# Patient Record
(demographics unavailable — no encounter records)

---

## 2024-12-30 NOTE — HISTORY OF PRESENT ILLNESS
[Right] : right hand dominant [FreeTextEntry1] : Presenting with about a 2-1/2-year course of bilateral shoulder pain.  He reports that about 7 years ago he had a left shoulder injury after lifting something heavy.  He never had it formally evaluated but did go to physical therapy and the pain got better with PT.  He does report that 2-1/2 years ago while lifting something heavy he had a rupture of his proximal biceps tendon.  He never sought treatment for this but does report having chronic shoulder pain starting around the time of the injury.  Over the last year and a half he has been going to therapy consistently for both his shoulders.  His therapy has focused on rotator cuff strengthening and range of motion.  He does report a steroid injection in the right shoulder by another physician performed about 6 months ago.  He reports no sustained improvement with physical therapy or the steroid injection.  He has taken Tylenol Motrin which does not control the pain.  He feels the pain significantly disrupts his daily life as he is always in pain and always has difficulty lifting heavy objects due to the pain.  He cannot sleep on either shoulder at night due to the pain.  He had an MRI performed at [facility] and had seen a shoulder surgeon there who was recommending a reverse total shoulder replacement.  He presents here today as a second opinion and because his insurance referred him here for evaluation for potential reverse total shoulder replacement.  Patient reports he has extensively investigated this procedure and is interested to learn more about it.  He is retired

## 2024-12-30 NOTE — PHYSICAL EXAM
[de-identified] : Physical Exam   Gen: NAD Resp: Nonlabored Cards: WWP   Left shoulder   Skin intact, no lesions Normal deltoid contour  TTP around GT/rotator cuff insertion, biceps tendon  NTTP AC joint, posterior scapula  No pain with neck ROM (full ROM)   ER arm at side 55 IR to his pocket No limitations in passive ROM but does have pain when passively bringing the arm above 90 degrees of forward flexion Negative drop arm test Positive speeds Supraspinatus impingement signs positive Neers positive Chatterjee positive Rotator cuff strength about 3 out of 5 but difficult to fully evaluate due to pain WWP No numbness axillary nerve sensation intact, deltoid strength intact   Right shoulder   Skin intact, no lesions Normal deltoid contour  TTP around GT/rotator cuff insertion, biceps tendon  NTTP AC joint, posterior scapula  No pain with neck ROM (full ROM)   ER arm at side 55 IR to his pocket No limitations in passive ROM but does have pain when passively bringing the arm above 90 degrees of forward flexion Negative drop arm test Positive speeds Supraspinatus impingement signs positive Neers positive Chatterjee positive Rotator cuff strength about 3 out of 5 but difficult to fully evaluate due to pain WWP No numbness axillary nerve sensation intact, deltoid strength intact  [de-identified] :  3 views of the bilateral shoulders were obtained and reviewed in clinic showing no fractures or dislocations, mild to moderate bilateral glenohumeral joint arthritis, there is superior translation of the proximal humerus bilaterally with significant narrowing of the acromiohumeral interval to less than 5 mm bilaterally

## 2024-12-30 NOTE — HISTORY OF PRESENT ILLNESS
[Right] : right hand dominant [FreeTextEntry1] : Presenting with about a 2-1/2-year course of bilateral shoulder pain.  He reports that about 7 years ago he had a left shoulder injury after lifting something heavy.  He never had it formally evaluated but did go to physical therapy and the pain got better with PT.  He does report that 2-1/2 years ago while lifting something heavy he had a rupture of his proximal biceps tendon.  He never sought treatment for this but does report having chronic shoulder pain starting around the time of the injury.  Over the last year and a half he has been going to therapy consistently for both his shoulders.  His therapy has focused on rotator cuff strengthening and range of motion.  He does report a steroid injection in the right shoulder by another physician performed about 6 months ago.  He reports no sustained improvement with physical therapy or the steroid injection.  He has taken Tylenol Motrin which does not control the pain.  He feels the pain significantly disrupts his daily life as he is always in pain and always has difficulty lifting heavy objects due to the pain.  He cannot sleep on either shoulder at night due to the pain.  He had an MRI performed at [facility] and had seen a shoulder surgeon there who was recommending a reverse total shoulder replacement.  He presents here today as a second opinion and because his insurance referred him here for evaluation for potential reverse total shoulder replacement.  Patient reports he has extensively investigated this procedure and is interested to learn more about it.  He is retired

## 2024-12-30 NOTE — PHYSICAL EXAM
[de-identified] : Physical Exam   Gen: NAD Resp: Nonlabored Cards: WWP   Left shoulder   Skin intact, no lesions Normal deltoid contour  TTP around GT/rotator cuff insertion, biceps tendon  NTTP AC joint, posterior scapula  No pain with neck ROM (full ROM)   ER arm at side 55 IR to his pocket No limitations in passive ROM but does have pain when passively bringing the arm above 90 degrees of forward flexion Negative drop arm test Positive speeds Supraspinatus impingement signs positive Neers positive Chatterjee positive Rotator cuff strength about 3 out of 5 but difficult to fully evaluate due to pain WWP No numbness axillary nerve sensation intact, deltoid strength intact   Right shoulder   Skin intact, no lesions Normal deltoid contour  TTP around GT/rotator cuff insertion, biceps tendon  NTTP AC joint, posterior scapula  No pain with neck ROM (full ROM)   ER arm at side 55 IR to his pocket No limitations in passive ROM but does have pain when passively bringing the arm above 90 degrees of forward flexion Negative drop arm test Positive speeds Supraspinatus impingement signs positive Neers positive Chatterjee positive Rotator cuff strength about 3 out of 5 but difficult to fully evaluate due to pain WWP No numbness axillary nerve sensation intact, deltoid strength intact  [de-identified] :  3 views of the bilateral shoulders were obtained and reviewed in clinic showing no fractures or dislocations, mild to moderate bilateral glenohumeral joint arthritis, there is superior translation of the proximal humerus bilaterally with significant narrowing of the acromiohumeral interval to less than 5 mm bilaterally

## 2024-12-30 NOTE — ASSESSMENT
[FreeTextEntry1] : 71-year-old male with bilateral rotator cuff arthropathy of the shoulder    -Discussed diagnosis, natural history of condition, and treatment options -Discussed the role of continued physical therapy and steroid injections.  At this time the patient has failed extensive nonoperative management with about a year and a half of therapy combined with Tylenol Motrin and a steroid injection that provided no pain relief. -Discussed surgical intervention in the form of shoulder arthroscopy and rotator cuff repair.  Given the narrowing of the acromiohumeral interval and the chronic nature of his rotator cuff injury I do not believe the surgery would have much success and also result in a prolonged recovery process. -I do asked that he obtain the report and imaging of his MRI and send then to my office so I can review them personally -Discussed the role of surgical intervention in the form of reverse shoulder arthroplasty.  Explained this type of shoulder replacement can provide pain control in patients such as him who have both rotator cuff dysfunction and developing arthritis.  Risk benefits and alternatives of surgery were discussed with the patient including bleeding infection nerve injury shoulder stiffness fracture and need for further surgery.  Patient understands and since his pain has significantly impacted his life and has failed to respond to nonoperative management he would like to proceed with surgery. -We will obtain a CT scan of the left shoulder for preoperative planning as this is the shoulder that bothers him the most.  He will send me the MRI images and report.  Once I review both I will call him to discuss the results and again the indications for surgery  In the meantime we will plan preemptively for left reverse total shoulder replacement at the end of this month.  All questions answered patient in agreement with the plan

## 2025-05-02 NOTE — DATA REVIEWED
[FreeTextEntry1] : Normal EKG: normal sinus rhythm, no axis deviation, no acute changes, no prior EKG for comparison

## 2025-05-02 NOTE — HISTORY OF PRESENT ILLNESS
[FreeTextEntry1] : cpe  [de-identified] : Acute issues  Gets dizzy  started 2 weeks ago  Feels that when he lies down and when he gets up the room starts to spin  throughout the day is ok unless he takes a nap  No chest pain or shortness of breath  was seeing a pcp at [facility]  arthritis shoulder and hands has an appointment with rheumatology and already saw Ortho  gets trigger fingers BL   HTN Has BP cuff at home: yes  BP: SBP range from 130s Took meds this AM: no  Overall compliance: GOOD Salt intake: Limited Denies HA, blurry vision, N/V Meds:  amlodipine 5mg Carvedilol 12.5 mg twice daily Losartan 100 mg daily  Hyperlipidemia on rosuvastatin 5 mg  7/2024 labs at [facility] were unremarkable except for elevated cholesterol  - Colonoscopy: Cologuard but does not remember the last time he had it done

## 2025-05-02 NOTE — HEALTH RISK ASSESSMENT
[Fair] : ~his/her~ current health as fair  [Good] : ~his/her~  mood as  good [No] : In the past 12 months have you used drugs other than those required for medical reasons? No [No falls in past year] : Patient reported no falls in the past year [Little interest or pleasure doing things] : 1) Little interest or pleasure doing things [Feeling down, depressed, or hopeless] : 2) Feeling down, depressed, or hopeless [0] : 2) Feeling down, depressed, or hopeless: Not at all (0) [PHQ-2 Negative - No further assessment needed] : PHQ-2 Negative - No further assessment needed [Yes] : Reviewed medication list for presence of high-risk medications. [Never] : Never [NO] : No [None] : None [With Family] : lives with family [Retired] : retired [College] : College [] :  [# Of Children ___] : has [unfilled] children [Sexually Active] : sexually active [Feels Safe at Home] : Feels safe at home [Fully functional (bathing, dressing, toileting, transferring, walking, feeding)] : Fully functional (bathing, dressing, toileting, transferring, walking, feeding) [Fully functional (using the telephone, shopping, preparing meals, housekeeping, doing laundry, using] : Fully functional and needs no help or supervision to perform IADLs (using the telephone, shopping, preparing meals, housekeeping, doing laundry, using transportation, managing medications and managing finances) [Smoke Detector] : smoke detector [Carbon Monoxide Detector] : carbon monoxide detector [Safety elements used in home] : safety elements used in home [Seat Belt] :  uses seat belt [FreeTextEntry1] : 1) Arthritis shoulders 2) Dizziness 3)Left lower side back pain  [Time Spent: ___ Minutes] : I spent [unfilled] minutes performing a depression screening for this patient. [de-identified] : very active at home [de-identified] : used to be vegan but  now is on a regular diet  [[id number]] : 0 [Change in mental status noted] : No change in mental status noted [Language] : denies difficulty with language [Behavior] : denies difficulty with behavior [Learning/Retaining New Information] : denies difficulty learning/retaining new information [Handling Complex Tasks] : denies difficulty handling complex tasks [Reasoning] : denies difficulty with reasoning [Spatial Ability and Orientation] : denies difficulty with spatial ability and orientation [Reports changes in hearing] : Reports no changes in hearing [Reports changes in vision] : Reports no changes in vision [Reports normal functional visual acuity (ie: able to read med bottle)] : Reports poor functional visual acuity.  [Reports changes in dental health] : Reports no changes in dental health [ColonoscopyComments] : Daya 01/2022?  [de-identified] : Last vision exam: 04/2025 [de-identified] : Last dental visit 10/2024

## 2025-05-02 NOTE — PLAN
[FreeTextEntry1] : Follow-up in 4 weeks for blood pressure check  I have spent 30 minutes of time on the encounter on acute issues which excludes teaching and separately reported services of the preventative exam

## 2025-05-02 NOTE — PHYSICAL EXAM
[Normal Sclera/Conjunctiva] : normal sclera/conjunctiva [Normal Outer Ear/Nose] : the outer ears and nose were normal in appearance [No JVD] : no jugular venous distention [Normal] : normal rate, regular rhythm, normal S1 and S2 and no murmur heard [Non-distended] : non-distended [Coordination Grossly Intact] : coordination grossly intact [No Focal Deficits] : no focal deficits [Normal Affect] : the affect was normal [Normal Insight/Judgement] : insight and judgment were intact [de-identified] : Decreased range of motion in right sidebending secondary to pain [de-identified] : Trigger finger of left pinky and right ring finger

## 2025-05-08 NOTE — ASSESSMENT
[FreeTextEntry1] : 71M with right ring trigger finger and left small trigger finger    - Discussed diagnosis, natural history of condition, and treatment options - Steroid injection today, risks discussed with patient including infection and skin discoloration, patient understands - Activity as tolerated - Recommend giving the injection 4-6 weeks to work, if no improvement will consider a repeat injection - Discussed risks, benefits, and alternative to surgical intervention  (including infection, bleeding, stiffness, wound issues): would consider if no improvement after 2 injections - All questions answered, patient in agreement with the plan

## 2025-05-08 NOTE — PHYSICAL EXAM
[de-identified] :  Physical Exam:   General: NAD Resp: Nonlabored Cards: WWP     Right ring and left small + TTP A1 pulley + triggering and locking No numbness or tingling WWP No triggering of adjacent digits, no pain over adjacent A1 pulleys Intrinsic Stiffness - [de-identified] :  3 views of the b/l hands were obtained and reviewed in clinic showing no fractures or dislocations, preserved joint spaces, no lesions, and no soft tissue abnormalities

## 2025-05-08 NOTE — HISTORY OF PRESENT ILLNESS
[FreeTextEntry1] : previously seen for shoulder pain, now here for trigger fingers bilaterally. Right ring finger, left pinky. Happening for a few months. No trauma. Painful.

## 2025-05-08 NOTE — PROCEDURE
[FreeTextEntry1] :  Procedure: Injection: After proceeding with informed consent and discussing the risks, benefits, and alternatives to a steroid injection (such as infection, skin discoloration, and skin atrophy) 4mg of dexamethasone and 0.5cc of 1% lidocaine was injected into the right ring finger A1 pulley under sterile conditions. The patient tolerated the procedure well and there were no complications. Post-injection instructions were discussed with the patient.   Procedure: Injection: After proceeding with informed consent and discussing the risks, benefits, and alternatives to a steroid injection (such as infection, skin discoloration, and skin atrophy) 4mg of dexamethasone and 0.5cc of 1% lidocaine was injected into the left small finger A1 pulley area  under sterile conditions. The patient tolerated the procedure well and there were no complications. Post-injection instructions were discussed with the patient.

## 2025-05-30 NOTE — HISTORY OF PRESENT ILLNESS
[FreeTextEntry1] : 70yo M in the office for evaluation of arthralgias, PMHx HTN, HLD  History two years of progressive arthralgias shoulder, hands, feet daily symptoms variable intensity overtime some areas improved, others worse most recently, trigger fingers bilateral hands, ortho treatment, s/p steroid injections shoulder tendinitis, arthralgias, ortho treatment PRP with improvement after treatment ankle/foot pain L > R lateral aspect mid foot  ROS no synovitis no dactylitis no oral ulcers no raynauds no psoriasis had a single episode of gout, 2/2 to use of diuretic that let to hyperuricemia

## 2025-06-06 NOTE — HISTORY OF PRESENT ILLNESS
[FreeTextEntry1] : BP follow up  [de-identified] : HTN Has BP cuff at home: yes BP: SBP range from 130s Took meds this AM: no Overall compliance: GOOD Salt intake: Limited Denies HA, blurry vision, N/V Meds: amlodipine 5mg Carvedilol 12.5 mg twice daily Losartan 100 mg daily  Was not taking rosuvastatin at the time the labs were done  Therefore he would like to restart at 5 mg again with f/u august for repeat lipids   Had hand injection and going back for another visit Went to rheumatology and was advised to start meloxicam

## 2025-06-09 NOTE — PHYSICAL EXAM
[de-identified] :  Physical Exam:   General: NAD Resp: Nonlabored Cards: WWP     Right ring and left small + TTP A1 pulley + triggering and locking No numbness or tingling WWP No triggering of adjacent digits, no pain over adjacent A1 pulleys Intrinsic Stiffness -

## 2025-06-09 NOTE — PHYSICAL EXAM
[de-identified] :  Physical Exam:   General: NAD Resp: Nonlabored Cards: WWP     Right ring and left small + TTP A1 pulley + triggering and locking No numbness or tingling WWP No triggering of adjacent digits, no pain over adjacent A1 pulleys Intrinsic Stiffness -

## 2025-06-09 NOTE — ASSESSMENT
[FreeTextEntry1] : 71-year-old male with bilateral rotator cuff arthropathy of the shoulder  2nd injections today      - Discussed diagnosis, natural history of condition, and treatment options - Steroid injection today, risks discussed with patient including infection and skin discoloration, patient understands - Activity as tolerated - Recommend giving the injection 4-6 weeks to work, if no improvement will consider a repeat injection - Discussed risks, benefits, and alternative to surgical intervention  (including infection, bleeding, stiffness, wound issues): would consider if no improvement after 2 injections - All questions answered, patient in agreement with the plan

## 2025-06-09 NOTE — HISTORY OF PRESENT ILLNESS
[FreeTextEntry1] : Trigger fingers have returned, steroid worked in rign for 2 weeks, made 5th digit much better but not fully. Repeat injs today